# Patient Record
Sex: FEMALE | Race: BLACK OR AFRICAN AMERICAN | Employment: OTHER | URBAN - METROPOLITAN AREA
[De-identification: names, ages, dates, MRNs, and addresses within clinical notes are randomized per-mention and may not be internally consistent; named-entity substitution may affect disease eponyms.]

---

## 2017-01-06 RX ORDER — DILTIAZEM HYDROCHLORIDE 180 MG/1
CAPSULE, EXTENDED RELEASE ORAL
Qty: 180 CAP | Refills: 3 | Status: SHIPPED | OUTPATIENT
Start: 2017-01-06 | End: 2018-01-22 | Stop reason: SDUPTHER

## 2017-10-31 ENCOUNTER — HOSPITAL ENCOUNTER (OUTPATIENT)
Dept: MAMMOGRAPHY | Age: 67
Discharge: HOME | End: 2017-10-31
Payer: COMMERCIAL

## 2017-10-31 DIAGNOSIS — Z12.31 VISIT FOR SCREENING MAMMOGRAM: ICD-10-CM

## 2017-10-31 PROCEDURE — 77067 SCR MAMMO BI INCL CAD: CPT

## 2017-11-03 ENCOUNTER — OFFICE VISIT (OUTPATIENT)
Dept: CARDIOLOGY CLINIC | Age: 67
End: 2017-11-03

## 2017-11-03 VITALS
HEIGHT: 61 IN | OXYGEN SATURATION: 97 % | SYSTOLIC BLOOD PRESSURE: 144 MMHG | WEIGHT: 206 LBS | BODY MASS INDEX: 38.89 KG/M2 | DIASTOLIC BLOOD PRESSURE: 90 MMHG | HEART RATE: 79 BPM

## 2017-11-03 DIAGNOSIS — I10 ESSENTIAL HYPERTENSION: ICD-10-CM

## 2017-11-03 DIAGNOSIS — I47.1 PAROXYSMAL SUPRAVENTRICULAR TACHYCARDIA (HCC): Primary | ICD-10-CM

## 2017-11-03 DIAGNOSIS — M79.89 LEG SWELLING: ICD-10-CM

## 2017-11-03 DIAGNOSIS — E78.5 DYSLIPIDEMIA: ICD-10-CM

## 2017-11-03 RX ORDER — LOSARTAN POTASSIUM AND HYDROCHLOROTHIAZIDE 12.5; 1 MG/1; MG/1
1 TABLET ORAL DAILY
Qty: 30 TAB | Refills: 6 | Status: SHIPPED | OUTPATIENT
Start: 2017-11-03 | End: 2018-05-21 | Stop reason: SDUPTHER

## 2017-11-03 RX ORDER — LOSARTAN POTASSIUM 50 MG/1
50 TABLET ORAL DAILY
COMMUNITY
End: 2017-11-03 | Stop reason: ALTCHOICE

## 2017-11-03 NOTE — PROGRESS NOTES
HISTORY OF PRESENT ILLNESS  Liam Jordan is a 73 y.o. female. Leg Swelling   Pertinent negatives include no chest pain, no abdominal pain, no headaches and no shortness of breath. Hypertension   Pertinent negatives include no chest pain, no abdominal pain, no headaches and no shortness of breath. Patient presents for a scheduled followup visit. She has a past medical history significant for paroxysmal supraventricular tachycardia, hypertension, and dyslipidemia. The patient has been managed with long-acting diltiazem to prevent her tachycardia. The patient was last seen in the office one year ago. Since last visit, her blood pressure medications were changed by her PCP. She states she was having an ACE inhibitor induced cough, so her lisinopril was switched to losartan. She also states her Lasix was discontinued because her leg swelling improved. Since changing the blood pressure medications, she has noted that her blood pressure has been consistently elevated. She also has noted some slight increase in her leg swelling. She denies any recurrent palpitations, no shortness of breath, no orthopnea, no PND. No chest pain or pressure. No major change in her activity tolerance. Past Medical History:   Diagnosis Date    Arthritis     joint pain/swelling    Cardiac echocardiogram 12/01/2014    EF 60%. No WMA. Normal diastolic fx. No significant valvular heart disease.  Cardiac Holter monitoring study, normal 10/30/2007    Normal Holter study.  Cardiac nuclear imaging test 10/26/2007    Mild apical thinning w/o signs of ischemia or infarction. No WMA. EF 75%.  Dyslipidemia     Hypertension     Indigestion     heartburn    Menopause     Supraventricular tachycardia (HCC)       Current Outpatient Prescriptions   Medication Sig Dispense Refill    losartan-hydroCHLOROthiazide (HYZAAR) 100-12.5 mg per tablet Take 1 Tab by mouth daily.  30 Tab 6    TAZTIA  mg SR capsule TAKE 1 CAPSULE BY MOUTH TWICE DAILY 180 Cap 3    aspirin delayed-release (ECOTRIN LOW STRENGTH) 81 mg tablet Take  by mouth daily. Allergies   Allergen Reactions    Lisinopril Cough    Iodine Hives    Zocor [Simvastatin] Myalgia      Social History   Substance Use Topics    Smoking status: Former Smoker    Smokeless tobacco: Never Used    Alcohol use No         Review of Systems   Constitutional: Negative for chills, fever and weight loss. HENT: Negative for nosebleeds. Eyes: Negative for blurred vision and double vision. Respiratory: Negative for cough, shortness of breath and wheezing. Cardiovascular: Positive for leg swelling. Negative for chest pain, palpitations, orthopnea, claudication and PND. Gastrointestinal: Negative for abdominal pain, heartburn, nausea and vomiting. Genitourinary: Negative for dysuria and hematuria. Musculoskeletal: Negative for falls and myalgias. Skin: Negative for rash. Neurological: Negative for dizziness, focal weakness and headaches. Endo/Heme/Allergies: Does not bruise/bleed easily. Psychiatric/Behavioral: Negative for substance abuse. Visit Vitals    /90    Pulse 79    Ht 5' 1\" (1.549 m)    Wt 93.4 kg (206 lb)    SpO2 97%    BMI 38.92 kg/m2      Physical Exam   Constitutional: She is oriented to person, place, and time. She appears well-developed and well-nourished. HENT:   Head: Normocephalic and atraumatic. Eyes: Conjunctivae are normal.   Neck: Neck supple. No JVD present. Carotid bruit is not present. Cardiovascular: Normal rate, regular rhythm, S1 normal, S2 normal and normal pulses. Exam reveals no gallop. No murmur heard. Pulmonary/Chest: Effort normal and breath sounds normal. She has no wheezes. She has no rales. Abdominal: Soft. Bowel sounds are normal. There is no tenderness. Musculoskeletal: She exhibits edema (Trace). Neurological: She is alert and oriented to person, place, and time.    Skin: Skin is warm and dry. EKG: Normal sinus rhythm, Normal axis, normal QTc interval, borderline poor R-wave progression, no ST or T wave abnormalities concerning for ischemia, no change compared to previous EKG. ASSESSMENT and PLAN    Paroxysmal supraventricular tachycardia. No significant recurrent over the past few years. Patient remains on Cardizem, which I would continue since this is also helping with blood pressure as well. Hypertension. Patient blood pressure has been elevated since stopping her Lasix and switching from lisinopril to losartan. I recommended changing losartan to losartan/HCTZ 100/12.5 mg daily. The patient can check her blood pressure at home and call us with the results. I suspect this will bring her blood pressure back into range. Dyslipidemia. Patient has been intolerant to multiple statins, and she is working on diet control, her goal LDL should be under 100. This has been followed closely by her PCP. Dependent edema. Patient feels that this has increased slightly since her medications were changed and her blood pressure has been more elevated. I suspect by adding the low-dose thiazide diuretic this should improve. Obesity. Patient's weight is essentially unchanged since last visit. She is now adhering to a primarily plant-based diet.     Followup in 12 months, sooner if needed

## 2017-11-03 NOTE — MR AVS SNAPSHOT
Visit Information Date & Time Provider Department Dept. Phone Encounter #  
 11/3/2017  8:20 AM Hiram William MD [facility] [phone] Your Appointments 10/5/2018  8:20 AM  
Follow Up with Hiram William MD  
[facility] ([address]) Appt Note: 1 year follow up with an EKG/pt will be leaving area  and wanted sooner appt [place] [address] [address] [address] [phone] [address] [place] [address] [address] Upcoming Health Maintenance Date Due Hepatitis C Screening 1950 DTaP/Tdap/Td series (1 - Tdap) 11/29/1972 FOBT Q 1 YEAR AGE 50-75 11/29/2001 ZOSTER VACCINE AGE 60> 9/29/2011 GLAUCOMA SCREENING Q2Y 11/29/2016 OSTEOPOROSIS SCREENING (DEXA) 11/29/2016 Pneumococcal 65+ Low/Medium Risk (1 of 2 - PCV13) 11/29/2016 MEDICARE YEARLY EXAM 11/29/2016 INFLUENZA AGE 9 TO ADULT 8/1/2017 BREAST CANCER SCRN MAMMOGRAM 10/31/2019 Allergies as of 11/3/2017  Review Complete On: 11/30/2016 By: Hiram William MD  
  
 Severity Noted Reaction Type Reactions Iodine  07/06/2011    Hives Zocor [Simvastatin]  07/06/2011    Myalgia Current Immunizations  Never Reviewed No immunizations on file. Not reviewed this visit You Were Diagnosed With   
  
 Codes Comments Paroxysmal supraventricular tachycardia (HCC)    -  Primary ICD-10-CM: I47.1 ICD-9-CM: 427.0 Essential hypertension     ICD-10-CM: I10 
ICD-9-CM: 401.9 Leg swelling     ICD-10-CM: M79.89 ICD-9-CM: 729.81 Dyslipidemia     ICD-10-CM: E78.5 ICD-9-CM: 272.4 Vitals BP Pulse Height(growth percentile) Weight(growth percentile) SpO2 BMI  
 144/90 79 5' 1\" (1.549 m) 206 lb (93.4 kg) 97% 38.92 kg/m2 Smoking Status Former Smoker Vitals History BMI and BSA Data Body Mass Index Body Surface Area  
 38.92 kg/m 2 2 m 2 Preferred Pharmacy Pharmacy Name Phone [address], [phone] [facility] [phone] Your Updated Medication List  
  
   
This list is accurate as of: 11/3/17  8:55 AM.  Always use your most recent med list.  
  
  
  
  
 ECOTRIN LOW STRENGTH 81 mg tablet Generic drug:  aspirin delayed-release Take  by mouth daily. losartan 50 mg tablet Commonly known as:  COZAAR Take 50 mg by mouth daily. TAZTIA  mg SR capsule Generic drug:  dilTIAZem TAKE 1 CAPSULE BY MOUTH TWICE DAILY We Performed the Following AMB POC EKG ROUTINE W/ 12 LEADS, INTER & REP [14056 CPT(R)] Patient Instructions Stop losartin Start losartin/hct 100/12.5 Introducing [facility]! Carmen Aquino introduces [employer] patient portal. Now you can access parts of your medical record, email your doctor's office, and request medication refills online. 1. In your internet browser, go to [email or web address] 2. Click on the First Time User? Click Here link in the Sign In box. You will see the New Member Sign Up page. 3. Enter your [employer] Access Code exactly as it appears below. You will not need to use this code after youve completed the sign-up process. If you do not sign up before the expiration date, you must request a new code. · [employer] Access Code: [email or web address] Expires: 1/15/2018 11:10 AM 
 
4. Enter the last four digits of your Social Security Number (xxxx) and Date of Birth (mm/dd/yyyy) as indicated and click Submit. You will be taken to the next sign-up page. 5. Create a Panayat ID. This will be your [employer] login ID and cannot be changed, so think of one that is secure and easy to remember. 6. Create a R[employer]o password. You can change your password at any time. 7. Enter your Password Reset Question and Answer. This can be used at a later time if you forget your password. 8. Enter your e-mail address. You will receive e-mail notification when new information is available in [address] 9. Click Sign Up. You can now view and download portions of your medical record. 10. Click the Download Summary menu link to download a portable copy of your medical information. If you have questions, please visit the Frequently Asked Questions section of the PLAYD8 website. Remember, [employer] is NOT to be used for urgent needs. For medical emergencies, dial 911. Now available from your iPhone and Android! Please provide this summary of care documentation to your next provider. Your primary care clinician is listed as Jenna Farrell. If you have any questions after today's visit, please call [phone].

## 2018-01-22 RX ORDER — DILTIAZEM HYDROCHLORIDE 180 MG/1
CAPSULE, EXTENDED RELEASE ORAL
Qty: 180 CAP | Refills: 3 | Status: SHIPPED | OUTPATIENT
Start: 2018-01-22 | End: 2019-01-15 | Stop reason: SDUPTHER

## 2018-05-21 RX ORDER — LOSARTAN POTASSIUM AND HYDROCHLOROTHIAZIDE 12.5; 1 MG/1; MG/1
TABLET ORAL
Qty: 30 TAB | Refills: 11 | Status: SHIPPED | OUTPATIENT
Start: 2018-05-21 | End: 2018-10-25 | Stop reason: SDUPTHER

## 2018-10-25 ENCOUNTER — OFFICE VISIT (OUTPATIENT)
Dept: CARDIOLOGY CLINIC | Age: 68
End: 2018-10-25

## 2018-10-25 VITALS
WEIGHT: 212 LBS | HEIGHT: 61 IN | SYSTOLIC BLOOD PRESSURE: 138 MMHG | OXYGEN SATURATION: 98 % | DIASTOLIC BLOOD PRESSURE: 88 MMHG | HEART RATE: 74 BPM | BODY MASS INDEX: 40.02 KG/M2

## 2018-10-25 DIAGNOSIS — M79.89 LEG SWELLING: ICD-10-CM

## 2018-10-25 DIAGNOSIS — E78.5 DYSLIPIDEMIA: ICD-10-CM

## 2018-10-25 DIAGNOSIS — E66.9 OBESITY, UNSPECIFIED OBESITY SEVERITY, UNSPECIFIED OBESITY TYPE: ICD-10-CM

## 2018-10-25 DIAGNOSIS — I47.1 PAROXYSMAL SUPRAVENTRICULAR TACHYCARDIA (HCC): Primary | ICD-10-CM

## 2018-10-25 DIAGNOSIS — I10 ESSENTIAL HYPERTENSION: ICD-10-CM

## 2018-10-26 RX ORDER — LOSARTAN POTASSIUM AND HYDROCHLOROTHIAZIDE 12.5; 1 MG/1; MG/1
1 TABLET ORAL DAILY
Qty: 90 TAB | Refills: 3 | Status: SHIPPED | OUTPATIENT
Start: 2018-10-26

## 2018-10-26 NOTE — PROGRESS NOTES
HISTORY OF PRESENT ILLNESS Laci Guillen is a 78 y.o. female. Leg Swelling Pertinent negatives include no chest pain, no abdominal pain, no headaches and no shortness of breath. Hypertension Pertinent negatives include no chest pain, no abdominal pain, no headaches and no shortness of breath. SVT Pertinent negatives include no chest pain, no abdominal pain, no headaches and no shortness of breath. Follow-up Pertinent negatives include no chest pain, no abdominal pain, no headaches and no shortness of breath. Patient presents for a scheduled followup visit. She has a past medical history significant for paroxysmal supraventricular tachycardia, hypertension, and dyslipidemia. The patient has been managed with long-acting diltiazem to prevent her tachycardia. The patient was last seen in the office one year ago. Since last visit, the patient reports that she has been feeling very well. No major change in her activity level. No new heart palpitations, dizziness or syncope. She is not experience any worsening leg swelling. No shortness of breath at rest with exertion, no orthopnea or PND. Past Medical History:  
Diagnosis Date  Arthritis   
 joint pain/swelling  Cardiac echocardiogram 12/01/2014 EF 60%. No WMA. Normal diastolic fx. No significant valvular heart disease.  Cardiac Holter monitoring study, normal 10/30/2007 Normal Holter study.  Cardiac nuclear imaging test 10/26/2007 Mild apical thinning w/o signs of ischemia or infarction. No WMA. EF 75%.  Dyslipidemia  Hypertension  Indigestion   
 heartburn  Menopause  Supraventricular tachycardia ([address] Current Outpatient Medications Medication Sig Dispense Refill  losartan-hydroCHLOROthiazide (HYZAAR) 100-12.5 mg per tablet Take 1 Tab by mouth daily.  90 Tab 3  
 dilTIAZem (TAZTIA XT) 180 mg SR capsule TAKE 1 CAPSULE BY MOUTH TWICE DAILY 180 Cap 3  
  aspirin delayed-release (ECOTRIN LOW STRENGTH) 81 mg tablet Take  by mouth daily. Allergies Allergen Reactions  Lisinopril Cough  Iodine Hives  Zocor [Simvastatin] Myalgia Social History Tobacco Use  Smoking status: Former Smoker  Smokeless tobacco: Never Used Substance Use Topics  Alcohol use: No  
 Drug use: Not on file Review of Systems Constitutional: Negative for chills, fever and weight loss. HENT: Negative for nosebleeds. Eyes: Negative for blurred vision and double vision. Respiratory: Negative for cough, shortness of breath and wheezing. Cardiovascular: Positive for leg swelling. Negative for chest pain, palpitations, orthopnea, claudication and PND. Gastrointestinal: Negative for abdominal pain, heartburn, nausea and vomiting. Genitourinary: Negative for dysuria and hematuria. Musculoskeletal: Negative for falls and myalgias. Skin: Negative for rash. Neurological: Negative for dizziness, focal weakness and headaches. Endo/Heme/Allergies: Does not bruise/bleed easily. Psychiatric/Behavioral: Negative for substance abuse. Visit Vitals /88 Pulse 74 Ht 5' 1\" (1.549 m) Wt 96.2 kg (212 lb) SpO2 98% BMI 40.06 kg/m² Physical Exam  
Constitutional: She is oriented to person, place, and time. She appears well-developed and well-nourished. HENT:  
Head: Normocephalic and atraumatic. Eyes: Conjunctivae are normal.  
Neck: Neck supple. No JVD present. Carotid bruit is not present. Cardiovascular: Normal rate, regular rhythm, S1 normal, S2 normal and normal pulses. Exam reveals no gallop. No murmur heard. Pulmonary/Chest: Effort normal and breath sounds normal. She has no wheezes. She has no rales. Abdominal: Soft. Bowel sounds are normal. There is no tenderness. Musculoskeletal: She exhibits edema (Trace). Neurological: She is alert and oriented to person, place, and time. Skin: Skin is warm and dry. EKG: Normal sinus rhythm, Normal axis, normal QTc interval, borderline poor R-wave progression, no ST or T wave abnormalities concerning for ischemia, no change compared to previous EKG. ASSESSMENT and PLAN Paroxysmal supraventricular tachycardia. No significant recurrent over the past few years. Patient remains on Cardizem, which I would continue since this is also helping with blood pressure as well. Hypertension. Patient blood pressure now appears well-controlled on her current regimen which includes losartan/HCTZ and diltiazem. Both which I would continue. Dyslipidemia. Patient has been intolerant to multiple statins, and she is working on diet control, her goal LDL should be under 100. This has been followed closely by her PCP. Dependent edema. This has not significantly changed over the past 12 months. No need for additional diuretic therapy. Obesity. Patient's weight increased slightly since last year. She is encouraged to try lose weight with lifestyle modification. Followup in 12 months, sooner if needed

## 2018-12-18 ENCOUNTER — TELEPHONE (OUTPATIENT)
Dept: CARDIOLOGY CLINIC | Age: 68
End: 2018-12-18

## 2018-12-18 NOTE — TELEPHONE ENCOUNTER
Patient called with the information regarding the release of her medical records. She filled out and signed a medical records release form prior to moving . Missael Guzmán But did not know who she would be seeing at that time. The information that she gave is as follows:    [facility] [place] [address] [address], [address] [place] [address]# [phone]  Fax# [phone]    I filled the information in on her release form and faxed it to [facility].

## 2019-01-15 RX ORDER — DILTIAZEM HYDROCHLORIDE 180 MG/1
CAPSULE, EXTENDED RELEASE ORAL
Qty: 180 CAP | Refills: 3 | Status: SHIPPED | OUTPATIENT
Start: 2019-01-15

## 2021-08-03 PROBLEM — I10 HTN (HYPERTENSION): Status: RESOLVED | Noted: 2021-08-03 | Resolved: 2021-08-03

## 2022-04-18 NOTE — PROGRESS NOTES
1. Have you been to the ER, urgent care clinic since your last visit? Hospitalized since your last visit? No     2. Have you seen or consulted any other health care providers outside of the [address] [place] [address] since your last visit? Include any pap smears or colon screening.  no Detail Level: Zone Quality 226: Preventive Care And Screening: Tobacco Use: Screening And Cessation Intervention: Patient screened for tobacco use and is an ex/non-smoker

## 2024-05-15 ENCOUNTER — CONSULTATION/EVALUATION (OUTPATIENT)
Dept: URBAN - NONMETROPOLITAN AREA CLINIC 1 | Facility: CLINIC | Age: 74
End: 2024-05-15

## 2024-05-15 DIAGNOSIS — H25.813: ICD-10-CM

## 2024-05-15 PROCEDURE — 99204 OFFICE O/P NEW MOD 45 MIN: CPT

## 2024-05-15 PROCEDURE — 92025 CPTRIZED CORNEAL TOPOGRAPHY: CPT | Mod: NC

## 2024-05-15 PROCEDURE — 92134 CPTRZ OPH DX IMG PST SGM RTA: CPT | Mod: NC

## 2024-05-15 PROCEDURE — 92136 OPHTHALMIC BIOMETRY: CPT

## 2024-05-15 ASSESSMENT — VISUAL ACUITY
OS_BAT: 20/50
OS_CC: 20/50
OS_CC: 20/200
OD_CC: 20/40-1
OD_BAT: 20/25
OD_CC: 20/25
OS_AM: 20/25-
OD_PAM: 20/20
OD_PH: 20/30-2

## 2024-05-15 ASSESSMENT — TONOMETRY
OS_IOP_MMHG: 13
OD_IOP_MMHG: 13

## 2024-06-11 ENCOUNTER — PRE-OP/H&P (OUTPATIENT)
Dept: URBAN - NONMETROPOLITAN AREA CLINIC 1 | Facility: CLINIC | Age: 74
End: 2024-06-11

## 2024-06-11 VITALS
DIASTOLIC BLOOD PRESSURE: 82 MMHG | SYSTOLIC BLOOD PRESSURE: 126 MMHG | HEART RATE: 65 BPM | BODY MASS INDEX: 36.25 KG/M2 | HEIGHT: 61 IN | WEIGHT: 192 LBS

## 2024-06-11 DIAGNOSIS — L40.9: ICD-10-CM

## 2024-06-11 DIAGNOSIS — Z01.818: ICD-10-CM

## 2024-06-11 DIAGNOSIS — I10: ICD-10-CM

## 2024-06-11 DIAGNOSIS — E78.2: ICD-10-CM

## 2024-06-11 PROCEDURE — 99213 OFFICE O/P EST LOW 20 MIN: CPT

## 2024-06-18 ENCOUNTER — SURGERY/PROCEDURE (OUTPATIENT)
Facility: LOCATION | Age: 74
End: 2024-06-18

## 2024-06-18 DIAGNOSIS — H25.811: ICD-10-CM

## 2024-06-18 PROCEDURE — 66999LRI LRI

## 2024-06-18 PROCEDURE — 66984CV REMOVE CATARACT, INSERT LENS, CUSTOM VISION

## 2024-06-19 ENCOUNTER — POST OP/EVAL OF SECOND EYE (OUTPATIENT)
Dept: URBAN - NONMETROPOLITAN AREA CLINIC 1 | Facility: CLINIC | Age: 74
End: 2024-06-19

## 2024-06-19 DIAGNOSIS — H25.812: ICD-10-CM

## 2024-06-19 PROCEDURE — 99213 OFFICE O/P EST LOW 20 MIN: CPT | Mod: 24

## 2024-06-19 ASSESSMENT — VISUAL ACUITY
OS_SC: CF 3FT
OS_BAT: 20/50
OS_CC: 20/200
OS_AM: 20/25-1
OD_SC: 20/25
OU_SC: 20/20

## 2024-06-19 ASSESSMENT — TONOMETRY
OD_IOP_MMHG: 15
OS_IOP_MMHG: 15

## 2024-06-25 ENCOUNTER — SURGERY/PROCEDURE (OUTPATIENT)
Facility: LOCATION | Age: 74
End: 2024-06-25

## 2024-06-25 DIAGNOSIS — H25.812: ICD-10-CM

## 2024-06-25 PROCEDURE — 65772LRI LRI DURING CAT SX

## 2024-06-25 PROCEDURE — 66984CV REMOVE CATARACT, INSERT LENS, CUSTOM VISION: Mod: 79,LT

## 2024-06-25 PROCEDURE — 66999LNX LENSX

## 2024-06-25 PROCEDURE — 99199PCV PROF CUSTOM VISION PACKAGE

## 2024-06-26 ENCOUNTER — POST-OP (OUTPATIENT)
Dept: URBAN - NONMETROPOLITAN AREA CLINIC 1 | Facility: CLINIC | Age: 74
End: 2024-06-26

## 2024-06-26 DIAGNOSIS — Z98.41: ICD-10-CM

## 2024-06-26 DIAGNOSIS — Z98.42: ICD-10-CM

## 2024-06-26 PROCEDURE — 99024 POSTOP FOLLOW-UP VISIT: CPT

## 2024-06-26 ASSESSMENT — TONOMETRY
OS_IOP_MMHG: 16
OD_IOP_MMHG: 14

## 2024-06-26 ASSESSMENT — VISUAL ACUITY
OS_SC: 20/40
OD_SC: 20/20

## 2024-07-02 ENCOUNTER — POST-OP (OUTPATIENT)
Dept: URBAN - NONMETROPOLITAN AREA CLINIC 1 | Facility: CLINIC | Age: 74
End: 2024-07-02

## 2024-07-02 DIAGNOSIS — Z98.41: ICD-10-CM

## 2024-07-02 DIAGNOSIS — Z98.42: ICD-10-CM

## 2024-07-02 PROCEDURE — 99024 POSTOP FOLLOW-UP VISIT: CPT

## 2024-07-02 ASSESSMENT — TONOMETRY
OS_IOP_MMHG: 15
OD_IOP_MMHG: 15

## 2024-07-02 ASSESSMENT — VISUAL ACUITY
OS_SC: 20/30-2
OD_SC: 20/20

## 2024-07-23 ENCOUNTER — POST-OP (OUTPATIENT)
Dept: URBAN - NONMETROPOLITAN AREA CLINIC 1 | Facility: CLINIC | Age: 74
End: 2024-07-23

## 2024-07-23 DIAGNOSIS — Z98.41: ICD-10-CM

## 2024-07-23 DIAGNOSIS — Z98.42: ICD-10-CM

## 2024-07-23 PROCEDURE — 99024 POSTOP FOLLOW-UP VISIT: CPT

## 2024-07-23 ASSESSMENT — TONOMETRY
OS_IOP_MMHG: 14
OD_IOP_MMHG: 14

## 2024-07-23 ASSESSMENT — VISUAL ACUITY
OS_SC: 20/30-2
OD_SC: 20/20
OU_SC: 20/20

## 2024-11-20 ENCOUNTER — FOLLOW UP (OUTPATIENT)
Dept: URBAN - NONMETROPOLITAN AREA CLINIC 1 | Facility: CLINIC | Age: 74
End: 2024-11-20

## 2024-11-20 DIAGNOSIS — H52.4: ICD-10-CM

## 2024-11-20 DIAGNOSIS — H26.493: ICD-10-CM

## 2024-11-20 DIAGNOSIS — Z96.1: ICD-10-CM

## 2024-11-20 PROCEDURE — 99213 OFFICE O/P EST LOW 20 MIN: CPT

## 2024-11-20 PROCEDURE — 92015 DETERMINE REFRACTIVE STATE: CPT
